# Patient Record
Sex: MALE | Race: WHITE | NOT HISPANIC OR LATINO | Employment: FULL TIME | URBAN - METROPOLITAN AREA
[De-identification: names, ages, dates, MRNs, and addresses within clinical notes are randomized per-mention and may not be internally consistent; named-entity substitution may affect disease eponyms.]

---

## 2017-05-05 ENCOUNTER — OFFICE VISIT (OUTPATIENT)
Dept: URGENT CARE | Facility: MEDICAL CENTER | Age: 42
End: 2017-05-05
Payer: COMMERCIAL

## 2017-05-05 PROCEDURE — 69209 REMOVE IMPACTED EAR WAX UNI: CPT

## 2017-05-05 PROCEDURE — G0382 LEV 3 HOSP TYPE B ED VISIT: HCPCS

## 2017-05-05 PROCEDURE — 99283 EMERGENCY DEPT VISIT LOW MDM: CPT

## 2017-12-04 ENCOUNTER — APPOINTMENT (OUTPATIENT)
Dept: PHYSICAL THERAPY | Facility: MEDICAL CENTER | Age: 42
End: 2017-12-04
Payer: COMMERCIAL

## 2017-12-04 PROCEDURE — G8991 OTHER PT/OT GOAL STATUS: HCPCS

## 2017-12-04 PROCEDURE — 97110 THERAPEUTIC EXERCISES: CPT

## 2017-12-04 PROCEDURE — G8990 OTHER PT/OT CURRENT STATUS: HCPCS

## 2017-12-04 PROCEDURE — 97161 PT EVAL LOW COMPLEX 20 MIN: CPT

## 2017-12-08 ENCOUNTER — APPOINTMENT (OUTPATIENT)
Dept: PHYSICAL THERAPY | Facility: MEDICAL CENTER | Age: 42
End: 2017-12-08
Payer: COMMERCIAL

## 2017-12-08 PROCEDURE — 97140 MANUAL THERAPY 1/> REGIONS: CPT

## 2017-12-08 PROCEDURE — 97112 NEUROMUSCULAR REEDUCATION: CPT

## 2017-12-08 PROCEDURE — 97110 THERAPEUTIC EXERCISES: CPT

## 2017-12-13 ENCOUNTER — APPOINTMENT (OUTPATIENT)
Dept: PHYSICAL THERAPY | Facility: MEDICAL CENTER | Age: 42
End: 2017-12-13
Payer: COMMERCIAL

## 2017-12-13 PROCEDURE — 97110 THERAPEUTIC EXERCISES: CPT

## 2017-12-13 PROCEDURE — 97140 MANUAL THERAPY 1/> REGIONS: CPT

## 2017-12-15 ENCOUNTER — APPOINTMENT (OUTPATIENT)
Dept: PHYSICAL THERAPY | Facility: MEDICAL CENTER | Age: 42
End: 2017-12-15
Payer: COMMERCIAL

## 2017-12-18 ENCOUNTER — APPOINTMENT (OUTPATIENT)
Dept: PHYSICAL THERAPY | Facility: MEDICAL CENTER | Age: 42
End: 2017-12-18
Payer: COMMERCIAL

## 2017-12-18 PROCEDURE — 97140 MANUAL THERAPY 1/> REGIONS: CPT

## 2017-12-18 PROCEDURE — 97112 NEUROMUSCULAR REEDUCATION: CPT

## 2017-12-18 PROCEDURE — 97110 THERAPEUTIC EXERCISES: CPT

## 2017-12-21 ENCOUNTER — APPOINTMENT (OUTPATIENT)
Dept: PHYSICAL THERAPY | Facility: MEDICAL CENTER | Age: 42
End: 2017-12-21
Payer: COMMERCIAL

## 2017-12-21 PROCEDURE — 97140 MANUAL THERAPY 1/> REGIONS: CPT

## 2017-12-21 PROCEDURE — 97112 NEUROMUSCULAR REEDUCATION: CPT

## 2017-12-21 PROCEDURE — 97110 THERAPEUTIC EXERCISES: CPT

## 2018-01-02 ENCOUNTER — APPOINTMENT (OUTPATIENT)
Dept: PHYSICAL THERAPY | Facility: MEDICAL CENTER | Age: 43
End: 2018-01-02
Payer: COMMERCIAL

## 2018-01-02 PROCEDURE — 97110 THERAPEUTIC EXERCISES: CPT

## 2018-01-02 PROCEDURE — 97140 MANUAL THERAPY 1/> REGIONS: CPT

## 2018-01-04 ENCOUNTER — APPOINTMENT (OUTPATIENT)
Dept: PHYSICAL THERAPY | Facility: MEDICAL CENTER | Age: 43
End: 2018-01-04
Payer: COMMERCIAL

## 2018-01-09 ENCOUNTER — APPOINTMENT (OUTPATIENT)
Dept: PHYSICAL THERAPY | Facility: MEDICAL CENTER | Age: 43
End: 2018-01-09
Payer: COMMERCIAL

## 2018-01-09 PROCEDURE — G8991 OTHER PT/OT GOAL STATUS: HCPCS

## 2018-01-09 PROCEDURE — 97140 MANUAL THERAPY 1/> REGIONS: CPT

## 2018-01-09 PROCEDURE — 97110 THERAPEUTIC EXERCISES: CPT

## 2018-01-09 PROCEDURE — G8992 OTHER PT/OT  D/C STATUS: HCPCS

## 2018-01-12 ENCOUNTER — APPOINTMENT (OUTPATIENT)
Dept: PHYSICAL THERAPY | Facility: MEDICAL CENTER | Age: 43
End: 2018-01-12
Payer: COMMERCIAL

## 2018-09-20 ENCOUNTER — OFFICE VISIT (OUTPATIENT)
Dept: URGENT CARE | Age: 43
End: 2018-09-20
Payer: COMMERCIAL

## 2018-09-20 VITALS
SYSTOLIC BLOOD PRESSURE: 133 MMHG | WEIGHT: 200 LBS | RESPIRATION RATE: 18 BRPM | HEIGHT: 70 IN | BODY MASS INDEX: 28.63 KG/M2 | OXYGEN SATURATION: 96 % | TEMPERATURE: 98.1 F | DIASTOLIC BLOOD PRESSURE: 90 MMHG | HEART RATE: 66 BPM

## 2018-09-20 DIAGNOSIS — L25.9 CONTACT DERMATITIS, UNSPECIFIED CONTACT DERMATITIS TYPE, UNSPECIFIED TRIGGER: Primary | ICD-10-CM

## 2018-09-20 PROCEDURE — G0382 LEV 3 HOSP TYPE B ED VISIT: HCPCS | Performed by: FAMILY MEDICINE

## 2018-09-20 RX ORDER — PREDNISONE 10 MG/1
TABLET ORAL
Qty: 21 TABLET | Refills: 0 | Status: SHIPPED | OUTPATIENT
Start: 2018-09-20

## 2018-09-20 RX ORDER — METHYLPREDNISOLONE ACETATE 40 MG/ML
80 INJECTION, SUSPENSION INTRA-ARTICULAR; INTRALESIONAL; INTRAMUSCULAR; SOFT TISSUE ONCE
Status: COMPLETED | OUTPATIENT
Start: 2018-09-20 | End: 2018-09-20

## 2018-09-20 RX ADMIN — METHYLPREDNISOLONE ACETATE 80 MG: 40 INJECTION, SUSPENSION INTRA-ARTICULAR; INTRALESIONAL; INTRAMUSCULAR; SOFT TISSUE at 13:08

## 2018-09-20 NOTE — PROGRESS NOTES
[facility]        NAME: Warren Wooten is a 37 y o  male  : 1975    MRN: [id number]  DATE: 2018  TIME: 1:39 PM    Assessment and Plan   Contact dermatitis, unspecified contact dermatitis type, unspecified trigger [L25 9]  1  Contact dermatitis, unspecified contact dermatitis type, unspecified trigger  methylPREDNISolone acetate (DEPO-MEDROL) injection 80 mg    predniSONE 10 mg tablet         Patient Instructions       Follow up with PCP in 3-5 days  Proceed to  ER if symptoms worsen  Chief Complaint     Chief Complaint   Patient presents with    Rash     x tuesday, head and cheeks          History of Present Illness       Patient here for evaluation of a rash on his face and penis  Patient does not recall any specific exposure to poison ivy but 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  Patient states the rash is itchy  He denies any pain, tingling, fevers, chills, sweats, other lesions        Review of Systems   Review of Systems   Constitutional: Negative  Musculoskeletal: Negative  Skin: Positive for rash  Current Medications       Current Outpatient Prescriptions:     predniSONE 10 mg tablet, Six tablets day 1; 5 tablets day to; 4 tablets day 3; 3 tablets day 4; 2 tablets day 5; and 1 tablet day 6, Disp: 21 tablet, Rfl: 0  No current facility-administered medications for this visit  Current Allergies     Allergies as of 2018    (No Known Allergies)            The following portions of the patient's history were reviewed and updated as appropriate: allergies, current medications, past family history, past medical history, past social history, past surgical history and problem list      History reviewed  No pertinent past medical history  History reviewed  No pertinent surgical history  No family history on file  Medications have been verified          Objective   /90   Pulse 66   Temp 98 1 °F (36 7 °C) (Temporal)   Resp 18   Ht 5' 10" (1 778 m)   Wt 90 7 kg (200 lb)   SpO2 96%   BMI 28 70 kg/m²        Physical Exam     Physical Exam   Constitutional: He is oriented to person, place, and time  He appears well-developed and well-nourished  No distress  HENT:   Head: Normocephalic and atraumatic  Neurological: He is alert and oriented to person, place, and time  Skin: Skin is warm and dry  Rash (Non-dermatomal vesicular rash on the forehead both cheeks and right upper eyelid  No erythema  No pustules  No scaling ) noted  No petechiae and no purpura noted  Rash is maculopapular  Rash is not urticarial  No erythema  Psychiatric: He has a normal mood and affect  His behavior is normal    Nursing note and vitals reviewed

## 2018-09-20 NOTE — PATIENT INSTRUCTIONS
May take over-the-counter Claritin or Zyrtec as directed    Follow-up with your primary care physician or dermatologist symptoms are persisting

## 2018-09-30 ENCOUNTER — OFFICE VISIT (OUTPATIENT)
Dept: URGENT CARE | Facility: MEDICAL CENTER | Age: 43
End: 2018-09-30
Payer: COMMERCIAL

## 2018-09-30 VITALS
BODY MASS INDEX: 28.95 KG/M2 | SYSTOLIC BLOOD PRESSURE: 129 MMHG | RESPIRATION RATE: 18 BRPM | DIASTOLIC BLOOD PRESSURE: 78 MMHG | TEMPERATURE: 97.5 F | OXYGEN SATURATION: 95 % | HEART RATE: 66 BPM | WEIGHT: 202.2 LBS | HEIGHT: 70 IN

## 2018-09-30 DIAGNOSIS — L27.0 DRUG RASH: Primary | ICD-10-CM

## 2018-09-30 PROCEDURE — G0382 LEV 3 HOSP TYPE B ED VISIT: HCPCS | Performed by: PHYSICIAN ASSISTANT

## 2018-09-30 NOTE — PROGRESS NOTES
[facility]        NAME: Sherley Cali is a 37 y o  male  : 1975    MRN: [id number]  DATE: 2018  TIME: 7:51 PM    Assessment and Plan   Drug rash [L27 0]  1  Drug rash           Patient Instructions     Patient Instructions   Take benadryl at night  Zyrtec once daily and zantac twice daily- all over the counter        Follow up with PCP in 3-5 days  Proceed to  ER if symptoms worsen  Chief Complaint     Chief Complaint   Patient presents with    Rash     2 weeks ago, started with rash on forehead and cheek  DX with poison ivy  That cleared up  But now has rash on chest, right hip  History of Present Illness       20-year-old male presents complaining of rash  Was seen last week and diagnosed with poison ivy on the head and hand  He received a steroid injection prescribed oral prednisone  Reports about 3 days ago he started with a new rash that is pruritic and started on the chest and has spread out  toward the extremities  No fever, chills, change in detergents, soaps, shampoos  Takes Zyrtec daily  Original rash has since cleared        Review of Systems   Review of Systems   Constitutional: Negative for chills and fever  HENT: Negative for sore throat and trouble swallowing  Respiratory: Negative for cough and shortness of breath  Cardiovascular: Negative for chest pain  Musculoskeletal: Negative for myalgias  Skin: Positive for rash           Current Medications       Current Outpatient Prescriptions:     predniSONE 10 mg tablet, Six tablets day 1; 5 tablets day to; 4 tablets day 3; 3 tablets day 4; 2 tablets day 5; and 1 tablet day 6 (Patient not taking: Reported on 2018 ), Disp: 21 tablet, Rfl: 0    Current Allergies     Allergies as of 2018 - Reviewed 2018   Allergen Reaction Noted    Prednisone Rash 2018            The following portions of the patient's history were reviewed and updated as appropriate: allergies, current medications, past family history, past medical history, past social history, past surgical history and problem list      No past medical history on file  No past surgical history on file  No family history on file  Medications have been verified  Objective   /78   Pulse 66   Temp 97 5 °F (36 4 °C) (Tympanic)   Resp 18   Ht 5' 10" (1 778 m)   Wt 91 7 kg (202 lb 3 2 oz)   SpO2 95%   BMI 29 01 kg/m²        Physical Exam     Physical Exam   Constitutional: He appears well-developed and well-nourished  No distress  HENT:   Mouth/Throat: Oropharynx is clear and moist    Cardiovascular: Normal rate and regular rhythm  Pulmonary/Chest: Effort normal and breath sounds normal    Skin: He is not diaphoretic     Erythematous macular papular rash most prominent on the chest, minimal involvement of upper extremities

## 2020-01-18 ENCOUNTER — OFFICE VISIT (OUTPATIENT)
Dept: URGENT CARE | Facility: MEDICAL CENTER | Age: 45
End: 2020-01-18
Payer: COMMERCIAL

## 2020-01-18 VITALS
BODY MASS INDEX: 28.63 KG/M2 | RESPIRATION RATE: 18 BRPM | WEIGHT: 200 LBS | OXYGEN SATURATION: 95 % | DIASTOLIC BLOOD PRESSURE: 84 MMHG | TEMPERATURE: 99 F | SYSTOLIC BLOOD PRESSURE: 133 MMHG | HEART RATE: 75 BPM | HEIGHT: 70 IN

## 2020-01-18 DIAGNOSIS — J02.0 STREP PHARYNGITIS: Primary | ICD-10-CM

## 2020-01-18 LAB — S PYO AG THROAT QL: POSITIVE

## 2020-01-18 PROCEDURE — 87880 STREP A ASSAY W/OPTIC: CPT | Performed by: PHYSICIAN ASSISTANT

## 2020-01-18 PROCEDURE — G0382 LEV 3 HOSP TYPE B ED VISIT: HCPCS | Performed by: PHYSICIAN ASSISTANT

## 2020-01-18 RX ORDER — DIPHENOXYLATE HYDROCHLORIDE AND ATROPINE SULFATE 2.5; .025 MG/1; MG/1
1 TABLET ORAL DAILY
COMMUNITY

## 2020-01-18 RX ORDER — VITAMIN B COMPLEX
500 TABLET ORAL DAILY
COMMUNITY

## 2020-01-18 RX ORDER — AMOXICILLIN 500 MG/1
500 CAPSULE ORAL EVERY 8 HOURS SCHEDULED
Qty: 30 CAPSULE | Refills: 0 | Status: SHIPPED | OUTPATIENT
Start: 2020-01-18 | End: 2020-01-28

## 2020-01-18 RX ORDER — CHLORAL HYDRATE 500 MG
1 CAPSULE ORAL DAILY
COMMUNITY
Start: 2019-03-14 | End: 2020-07-24

## 2020-01-18 NOTE — PATIENT INSTRUCTIONS
Warm water salt gargles, throat lozenges, honey/tea for sore throat  Take motrin as directed for pain  Increase fluid intake  Take antibiotic as directed  Eat yogurt to avoid GI upset

## 2020-01-18 NOTE — PROGRESS NOTES
[facility]        NAME: Daphne Morales is a 53 y o  male  : 1975    MRN: [id number]  DATE: 2020  TIME: 5:09 PM    Assessment and Plan   Strep pharyngitis [J02 0]  1  Strep pharyngitis  amoxicillin (AMOXIL) 500 mg capsule    POCT rapid strepA         Patient Instructions     Patient Instructions   Warm water salt gargles, throat lozenges, honey/tea for sore throat  Take motrin as directed for pain  Increase fluid intake  Take antibiotic as directed  Eat yogurt to avoid GI upset  Follow up with PCP in 3-5 days  Proceed to  ER if symptoms worsen  Chief Complaint     Chief Complaint   Patient presents with    Sore Throat     Patient presents with a sore throat and mild fevers since Wednesday  He reports body aches, headaches and chills  He has taken dayquil and nyquil for his symptoms  History of Present Illness       Sore Throat    This is a new problem  Episode onset: 3-4 days  The problem has been unchanged  There has been no fever  Associated symptoms include headaches and swollen glands  Pertinent negatives include no abdominal pain, congestion, coughing, diarrhea, ear pain, plugged ear sensation, shortness of breath or vomiting  He has had no exposure to strep or mono  He has tried acetaminophen for the symptoms  The treatment provided mild relief  Review of Systems   Review of Systems   Constitutional: Negative for chills, fatigue and fever  HENT: Positive for sore throat  Negative for congestion, ear pain, hearing loss, postnasal drip, sinus pressure and sinus pain  Eyes: Negative for pain and discharge  Respiratory: Negative for cough, chest tightness and shortness of breath  Cardiovascular: Negative for chest pain  Gastrointestinal: Negative for abdominal pain, constipation, diarrhea, nausea and vomiting  Genitourinary: Negative for difficulty urinating  Musculoskeletal: Negative for arthralgias and myalgias     Skin: Negative for rash    Neurological: Positive for headaches  Negative for dizziness  Psychiatric/Behavioral: Negative for behavioral problems  Current Medications       Current Outpatient Medications:     Omega-3 Fatty Acids (FISH OIL) 1,000 mg, Take 1 g by mouth daily, Disp: , Rfl:     amoxicillin (AMOXIL) 500 mg capsule, Take 1 capsule (500 mg total) by mouth every 8 (eight) hours for 10 days, Disp: 30 capsule, Rfl: 0    B Complex Vitamins (B-COMPLEX/B-12) TABS, Take 500 mcg by mouth daily, Disp: , Rfl:     Cetirizine HCl (ZYRTEC ALLERGY) 10 MG CAPS, Take 10 mg by mouth daily, Disp: , Rfl:     multivitamin (THERAGRAN) TABS, Take 1 tablet by mouth daily, Disp: , Rfl:     predniSONE 10 mg tablet, Six tablets day 1; 5 tablets day to; 4 tablets day 3; 3 tablets day 4; 2 tablets day 5; and 1 tablet day 6 (Patient not taking: Reported on 9/30/2018 ), Disp: 21 tablet, Rfl: 0    Current Allergies     Allergies as of 01/18/2020 - Reviewed 01/18/2020   Allergen Reaction Noted    Prednisone Rash 09/30/2018            The following portions of the patient's history were reviewed and updated as appropriate: allergies, current medications, past family history, past medical history, past social history, past surgical history and problem list      History reviewed  No pertinent past medical history  History reviewed  No pertinent surgical history  History reviewed  No pertinent family history  Medications have been verified  Objective   /84   Pulse 75   Temp 99 °F (37 2 °C) (Tympanic)   Resp 18   Ht 5' 10" (1 778 m)   Wt 90 7 kg (200 lb)   SpO2 95%   BMI 28 70 kg/m²        Physical Exam     Physical Exam   Constitutional: He is oriented to person, place, and time  He appears well-developed and well-nourished     HENT:   Right Ear: Tympanic membrane and external ear normal    Left Ear: Tympanic membrane and external ear normal    Mouth/Throat: Posterior oropharyngeal edema and posterior oropharyngeal erythema present  Tonsillar exudate  Neck: Normal range of motion  No edema present  Cardiovascular: Normal rate, regular rhythm, S1 normal, S2 normal and normal heart sounds  No murmur heard  Pulmonary/Chest: Effort normal and breath sounds normal  No respiratory distress  He has no wheezes  He has no rales  He exhibits no tenderness  Lymphadenopathy:     He has no cervical adenopathy  Neurological: He is alert and oriented to person, place, and time  Skin: Skin is warm, dry and intact  No rash noted  Psychiatric: He has a normal mood and affect  His speech is normal and behavior is normal    Nursing note and vitals reviewed

## 2020-07-24 ENCOUNTER — OFFICE VISIT (OUTPATIENT)
Dept: URGENT CARE | Age: 45
End: 2020-07-24
Payer: COMMERCIAL

## 2020-07-24 VITALS
TEMPERATURE: 98.7 F | OXYGEN SATURATION: 97 % | RESPIRATION RATE: 16 BRPM | WEIGHT: 195 LBS | BODY MASS INDEX: 27.92 KG/M2 | HEIGHT: 70 IN

## 2020-07-24 DIAGNOSIS — Z11.59 SCREENING FOR VIRAL DISEASE: Primary | ICD-10-CM

## 2020-07-24 PROCEDURE — G0381 LEV 2 HOSP TYPE B ED VISIT: HCPCS | Performed by: PHYSICIAN ASSISTANT

## 2020-07-24 PROCEDURE — U0003 INFECTIOUS AGENT DETECTION BY NUCLEIC ACID (DNA OR RNA); SEVERE ACUTE RESPIRATORY SYNDROME CORONAVIRUS 2 (SARS-COV-2) (CORONAVIRUS DISEASE [COVID-19]), AMPLIFIED PROBE TECHNIQUE, MAKING USE OF HIGH THROUGHPUT TECHNOLOGIES AS DESCRIBED BY CMS-2020-01-R: HCPCS | Performed by: PHYSICIAN ASSISTANT

## 2020-07-24 NOTE — PATIENT INSTRUCTIONS
Corona virus testing can take 5-7 days for results    Follow-up with your primary care physician if symptoms persist    Go to emergency room if symptoms worsen    COVID-19 Home Care Guidelines    Your healthcare provider and/or public health staff have evaluated you and have determined that you do not need to remain in the hospital at this time  At this time you can be isolated at home where you will be monitored by staff from your local or state health department  You should carefully follow the prevention and isolation steps below until a healthcare provider or local or state health department says that you can return to your normal activities  Stay home except to get medical care    People who are mildly ill with COVID-19 are able to isolate at home during their illness  You should restrict activities outside your home, except for getting medical care  Do not go to work, school, or public areas  Avoid using public transportation, ride-sharing, or taxis  Separate yourself from other people and animals in your home    People: As much as possible, you should stay in a specific room and away from other people in your home  Also, you should use a separate bathroom, if available  Animals: You should restrict contact with pets and other animals while you are sick with COVID-19, just like you would around other people  Although there have not been reports of pets or other animals becoming sick with COVID-19, it is still recommended that people sick with COVID-19 limit contact with animals until more information is known about the virus  When possible, have another member of your household care for your animals while you are sick  If you are sick with COVID-19, avoid contact with your pet, including petting, snuggling, being kissed or licked, and sharing food  If you must care for your pet or be around animals while you are sick, wash your hands before and after you interact with pets and wear a facemask   See COVID-19 and Animals for more information  Call ahead before visiting your doctor    If you have a medical appointment, call the healthcare provider and tell them that you have or may have COVID-19  This will help the healthcare providers office take steps to keep other people from getting infected or exposed  Wear a facemask    You should wear a facemask when you are around other people (e g , sharing a room or vehicle) or pets and before you enter a healthcare providers office  If you are not able to wear a facemask (for example, because it causes trouble breathing), then people who live with you should not stay in the same room with you, or they should wear a facemask if they enter your room  Cover your coughs and sneezes    Cover your mouth and nose with a tissue when you cough or sneeze  Throw used tissues in a lined trash can  Immediately wash your hands with soap and water for at least 20 seconds or, if soap and water are not available, clean your hands with an alcohol-based hand  that contains at least 60% alcohol  Clean your hands often    Wash your hands often with soap and water for at least 20 seconds, especially after blowing your nose, coughing, or sneezing; going to the bathroom; and before eating or preparing food  If soap and water are not readily available, use an alcohol-based hand  with at least 60% alcohol, covering all surfaces of your hands and rubbing them together until they feel dry  Soap and water are the best option if hands are visibly dirty  Avoid touching your eyes, nose, and mouth with unwashed hands  Avoid sharing personal household items    You should not share dishes, drinking glasses, cups, eating utensils, towels, or bedding with other people or pets in your home  After using these items, they should be washed thoroughly with soap and water      Clean all high-touch surfaces everyday    High touch surfaces include counters, tabletops, doorknobs, bathroom fixtures, toilets, phones, keyboards, tablets, and bedside tables  Also, clean any surfaces that may have blood, stool, or body fluids on them  Use a household cleaning spray or wipe, according to the label instructions  Labels contain instructions for safe and effective use of the cleaning product including precautions you should take when applying the product, such as wearing gloves and making sure you have good ventilation during use of the product  Monitor your symptoms    Seek prompt medical attention if your illness is worsening (e g , difficulty breathing)  Before seeking care, call your healthcare provider and tell them that you have, or are being evaluated for, COVID-19  Put on a facemask before you enter the facility  These steps will help the healthcare providers office to keep other people in the office or waiting room from getting infected or exposed  Ask your healthcare provider to call the local or [facility] health department  Persons who are placed under active monitoring or facilitated self-monitoring should follow instructions provided by their local health department or occupational health professionals, as appropriate  If you have a medical emergency and need to call 911, notify the dispatch personnel that you have, or are being evaluated for COVID-19  If possible, put on a facemask before emergency medical services arrive      Discontinuing home isolation    Patients with confirmed COVID-19 should remain under home isolation precautions until the following conditions are met:   - They have had no fever for at least 72 hours (that is three full days of no fever without the use medicine that reduces fevers)  AND  - other symptoms have improved (for example, when their cough or shortness of breath have improved)  AND  - at least 10 days have passed since their symptoms first appeared  Patients with confirmed COVID-19 should also notify close contacts (including their workplace) and ask that they self-quarantine  Currently, close contact is defined as being within 6 feet for 10 minutes or more from the period 48 hours before symptom onset to the time at which the patient went into isolation  Close contacts of patients diagnosed with COVID-19 should be instructed by the patient to self-quarantine for 14 days from the last time of their last contact with the patient       Source: [employer]

## 2020-07-24 NOTE — PROGRESS NOTES
[facility]        NAME: Hugh Ruano is a 53 y o  male  : 1975    MRN: [id number]  DATE: 2020  TIME: 6:49 PM    Assessment and Plan   Screening for viral disease [Z11 59]  1  Screening for viral disease  MISC COVID-19 TEST- Office Collection         Patient Instructions       Follow up with PCP in 3-5 days  Proceed to  ER if symptoms worsen  Chief Complaint     Chief Complaint   Patient presents with    COVID-19     exposed over the weekend to someone confirmed positive on tuesday    Elkin herman ss         History of Present Illness       Patient is here for evaluation for COVID-19  Patient was exposed to somebody who tested positive  Neither of them were wearing a mask  Review of Systems   Review of Systems   Constitutional: Negative  HENT: Negative  Eyes: Negative  Respiratory: Negative  Cardiovascular: Negative  Gastrointestinal: Negative            Current Medications       Current Outpatient Medications:     B Complex Vitamins (B-COMPLEX/B-12) TABS, Take 500 mcg by mouth daily, Disp: , Rfl:     Cetirizine HCl (ZYRTEC ALLERGY) 10 MG CAPS, Take 10 mg by mouth daily, Disp: , Rfl:     multivitamin (THERAGRAN) TABS, Take 1 tablet by mouth daily, Disp: , Rfl:     Omega-3 Fatty Acids (FISH OIL) 1,000 mg, Take 1 g by mouth daily, Disp: , Rfl:     VITAMIN D, CHOLECALCIFEROL, PO, Take by mouth, Disp: , Rfl:     predniSONE 10 mg tablet, Six tablets day 1; 5 tablets day to; 4 tablets day 3; 3 tablets day 4; 2 tablets day 5; and 1 tablet day 6 (Patient not taking: Reported on 2018 ), Disp: 21 tablet, Rfl: 0    Current Allergies     Allergies as of 2020 - Reviewed 2020   Allergen Reaction Noted    Prednisone Rash 2018            The following portions of the patient's history were reviewed and updated as appropriate: allergies, current medications, past family history, past medical history, past social history, past surgical history and problem list      History reviewed  No pertinent past medical history  History reviewed  No pertinent surgical history  No family history on file  Medications have been verified  Objective   Temp 98 7 °F (37 1 °C)   Resp 16   Ht 5' 10" (1 778 m)   Wt 88 5 kg (195 lb)   SpO2 97%   BMI 27 98 kg/m²        Physical Exam     Physical Exam   Constitutional: He is oriented to person, place, and time  He appears well-developed and well-nourished  No distress  HENT:   Head: Normocephalic and atraumatic  Cardiovascular: Normal rate, regular rhythm and normal heart sounds  No murmur heard  Pulmonary/Chest: Effort normal and breath sounds normal  No stridor  No respiratory distress  He has no wheezes  He has no rales  Neurological: He is alert and oriented to person, place, and time  Skin: Skin is warm and dry  He is not diaphoretic  Psychiatric: He has a normal mood and affect  His behavior is normal  Judgment and thought content normal    Nursing note and vitals reviewed

## 2020-07-27 ENCOUNTER — TELEPHONE (OUTPATIENT)
Dept: URGENT CARE | Age: 45
End: 2020-07-27

## 2020-07-27 LAB — SARS-COV-2 RNA SPEC QL NAA+PROBE: NOT DETECTED

## 2021-04-01 DIAGNOSIS — Z23 ENCOUNTER FOR IMMUNIZATION: ICD-10-CM

## 2021-06-07 ENCOUNTER — TRANSCRIBE ORDERS (OUTPATIENT)
Dept: ADMINISTRATIVE | Facility: HOSPITAL | Age: 46
End: 2021-06-07

## 2021-06-07 ENCOUNTER — APPOINTMENT (OUTPATIENT)
Dept: RADIOLOGY | Facility: MEDICAL CENTER | Age: 46
End: 2021-06-07
Payer: COMMERCIAL

## 2021-06-07 DIAGNOSIS — M54.50 LOW BACK PAIN, UNSPECIFIED BACK PAIN LATERALITY, UNSPECIFIED CHRONICITY, UNSPECIFIED WHETHER SCIATICA PRESENT: Primary | ICD-10-CM

## 2021-06-07 DIAGNOSIS — M54.50 LOW BACK PAIN, UNSPECIFIED BACK PAIN LATERALITY, UNSPECIFIED CHRONICITY, UNSPECIFIED WHETHER SCIATICA PRESENT: ICD-10-CM

## 2021-06-07 PROCEDURE — 72100 X-RAY EXAM L-S SPINE 2/3 VWS: CPT
